# Patient Record
Sex: MALE | Race: WHITE | Employment: FULL TIME | URBAN - METROPOLITAN AREA
[De-identification: names, ages, dates, MRNs, and addresses within clinical notes are randomized per-mention and may not be internally consistent; named-entity substitution may affect disease eponyms.]

---

## 2021-08-09 ENCOUNTER — APPOINTMENT (OUTPATIENT)
Dept: CT IMAGING | Age: 26
DRG: 885 | End: 2021-08-09
Payer: COMMERCIAL

## 2021-08-09 ENCOUNTER — HOSPITAL ENCOUNTER (INPATIENT)
Age: 26
LOS: 3 days | Discharge: HOME | DRG: 885 | End: 2021-08-12
Attending: EMERGENCY MEDICINE | Admitting: PSYCHIATRY & NEUROLOGY
Payer: COMMERCIAL

## 2021-08-09 DIAGNOSIS — R45.851 SUICIDAL IDEATION: Primary | ICD-10-CM

## 2021-08-09 LAB
ACETAMINOPHEN LEVEL: <5 MCG/ML (ref 10–30)
ALBUMIN SERPL-MCNC: 4.5 G/DL (ref 3.5–5.2)
ALP BLD-CCNC: 79 U/L (ref 40–129)
ALT SERPL-CCNC: 35 U/L (ref 0–40)
AMPHETAMINE SCREEN, URINE: NOT DETECTED
ANION GAP SERPL CALCULATED.3IONS-SCNC: 7 MMOL/L (ref 7–16)
AST SERPL-CCNC: 21 U/L (ref 0–39)
BARBITURATE SCREEN URINE: NOT DETECTED
BASOPHILS ABSOLUTE: 0.04 E9/L (ref 0–0.2)
BASOPHILS RELATIVE PERCENT: 0.6 % (ref 0–2)
BENZODIAZEPINE SCREEN, URINE: NOT DETECTED
BILIRUB SERPL-MCNC: 0.6 MG/DL (ref 0–1.2)
BILIRUBIN URINE: NEGATIVE
BLOOD, URINE: NEGATIVE
BUN BLDV-MCNC: 12 MG/DL (ref 6–20)
CALCIUM SERPL-MCNC: 9.5 MG/DL (ref 8.6–10.2)
CANNABINOID SCREEN URINE: NOT DETECTED
CHLORIDE BLD-SCNC: 104 MMOL/L (ref 98–107)
CLARITY: CLEAR
CO2: 29 MMOL/L (ref 22–29)
COCAINE METABOLITE SCREEN URINE: NOT DETECTED
COLOR: YELLOW
CREAT SERPL-MCNC: 0.9 MG/DL (ref 0.7–1.2)
EOSINOPHILS ABSOLUTE: 0.13 E9/L (ref 0.05–0.5)
EOSINOPHILS RELATIVE PERCENT: 2 % (ref 0–6)
ETHANOL: <10 MG/DL (ref 0–0.08)
FENTANYL SCREEN, URINE: NOT DETECTED
GFR AFRICAN AMERICAN: >60
GFR NON-AFRICAN AMERICAN: >60 ML/MIN/1.73
GLUCOSE BLD-MCNC: 95 MG/DL (ref 74–99)
GLUCOSE URINE: NEGATIVE MG/DL
HCT VFR BLD CALC: 49.6 % (ref 37–54)
HEMOGLOBIN: 16.9 G/DL (ref 12.5–16.5)
IMMATURE GRANULOCYTES #: 0.01 E9/L
IMMATURE GRANULOCYTES %: 0.2 % (ref 0–5)
INFLUENZA A: NOT DETECTED
INFLUENZA B: NOT DETECTED
KETONES, URINE: NEGATIVE MG/DL
LEUKOCYTE ESTERASE, URINE: NEGATIVE
LYMPHOCYTES ABSOLUTE: 1.68 E9/L (ref 1.5–4)
LYMPHOCYTES RELATIVE PERCENT: 26 % (ref 20–42)
Lab: NORMAL
MCH RBC QN AUTO: 29.8 PG (ref 26–35)
MCHC RBC AUTO-ENTMCNC: 34.1 % (ref 32–34.5)
MCV RBC AUTO: 87.5 FL (ref 80–99.9)
METHADONE SCREEN, URINE: NOT DETECTED
MONOCYTES ABSOLUTE: 0.47 E9/L (ref 0.1–0.95)
MONOCYTES RELATIVE PERCENT: 7.3 % (ref 2–12)
NEUTROPHILS ABSOLUTE: 4.13 E9/L (ref 1.8–7.3)
NEUTROPHILS RELATIVE PERCENT: 63.9 % (ref 43–80)
NITRITE, URINE: NEGATIVE
OPIATE SCREEN URINE: NOT DETECTED
OXYCODONE URINE: NOT DETECTED
PDW BLD-RTO: 11.8 FL (ref 11.5–15)
PH UA: 6.5 (ref 5–9)
PHENCYCLIDINE SCREEN URINE: NOT DETECTED
PLATELET # BLD: 282 E9/L (ref 130–450)
PMV BLD AUTO: 9.2 FL (ref 7–12)
POTASSIUM REFLEX MAGNESIUM: 4.4 MMOL/L (ref 3.5–5)
PROTEIN UA: NEGATIVE MG/DL
RBC # BLD: 5.67 E12/L (ref 3.8–5.8)
SALICYLATE, SERUM: <0.3 MG/DL (ref 0–30)
SARS-COV-2 RNA, RT PCR: NOT DETECTED
SODIUM BLD-SCNC: 140 MMOL/L (ref 132–146)
SPECIFIC GRAVITY UA: 1.02 (ref 1–1.03)
TOTAL PROTEIN: 7.2 G/DL (ref 6.4–8.3)
TRICYCLIC ANTIDEPRESSANTS SCREEN SERUM: NEGATIVE NG/ML
UROBILINOGEN, URINE: 0.2 E.U./DL
WBC # BLD: 6.5 E9/L (ref 4.5–11.5)

## 2021-08-09 PROCEDURE — 80143 DRUG ASSAY ACETAMINOPHEN: CPT

## 2021-08-09 PROCEDURE — 85025 COMPLETE CBC W/AUTO DIFF WBC: CPT

## 2021-08-09 PROCEDURE — 87636 SARSCOV2 & INF A&B AMP PRB: CPT

## 2021-08-09 PROCEDURE — 80307 DRUG TEST PRSMV CHEM ANLYZR: CPT

## 2021-08-09 PROCEDURE — 70450 CT HEAD/BRAIN W/O DYE: CPT

## 2021-08-09 PROCEDURE — 1240000000 HC EMOTIONAL WELLNESS R&B

## 2021-08-09 PROCEDURE — 80179 DRUG ASSAY SALICYLATE: CPT

## 2021-08-09 PROCEDURE — 82077 ASSAY SPEC XCP UR&BREATH IA: CPT

## 2021-08-09 PROCEDURE — 93005 ELECTROCARDIOGRAM TRACING: CPT | Performed by: EMERGENCY MEDICINE

## 2021-08-09 PROCEDURE — 99284 EMERGENCY DEPT VISIT MOD MDM: CPT

## 2021-08-09 PROCEDURE — 80053 COMPREHEN METABOLIC PANEL: CPT

## 2021-08-09 PROCEDURE — 81003 URINALYSIS AUTO W/O SCOPE: CPT

## 2021-08-09 RX ORDER — MAGNESIUM HYDROXIDE/ALUMINUM HYDROXICE/SIMETHICONE 120; 1200; 1200 MG/30ML; MG/30ML; MG/30ML
30 SUSPENSION ORAL PRN
Status: DISCONTINUED | OUTPATIENT
Start: 2021-08-09 | End: 2021-08-12 | Stop reason: HOSPADM

## 2021-08-09 RX ORDER — HALOPERIDOL 5 MG
5 TABLET ORAL EVERY 6 HOURS PRN
Status: DISCONTINUED | OUTPATIENT
Start: 2021-08-09 | End: 2021-08-12 | Stop reason: HOSPADM

## 2021-08-09 RX ORDER — HALOPERIDOL 5 MG/ML
5 INJECTION INTRAMUSCULAR EVERY 6 HOURS PRN
Status: DISCONTINUED | OUTPATIENT
Start: 2021-08-09 | End: 2021-08-12 | Stop reason: HOSPADM

## 2021-08-09 RX ORDER — HYDROXYZINE HYDROCHLORIDE 10 MG/1
50 TABLET, FILM COATED ORAL 3 TIMES DAILY PRN
Status: DISCONTINUED | OUTPATIENT
Start: 2021-08-09 | End: 2021-08-12 | Stop reason: HOSPADM

## 2021-08-09 RX ORDER — ACETAMINOPHEN 325 MG/1
650 TABLET ORAL EVERY 6 HOURS PRN
Status: DISCONTINUED | OUTPATIENT
Start: 2021-08-09 | End: 2021-08-12 | Stop reason: HOSPADM

## 2021-08-09 ASSESSMENT — PAIN SCALES - GENERAL
PAINLEVEL_OUTOF10: 1
PAINLEVEL_OUTOF10: 0

## 2021-08-09 ASSESSMENT — PAIN DESCRIPTION - LOCATION: LOCATION: HEAD

## 2021-08-09 ASSESSMENT — ENCOUNTER SYMPTOMS
DIARRHEA: 0
EYE REDNESS: 0
CONSTIPATION: 0
BACK PAIN: 0
NAUSEA: 0
ABDOMINAL DISTENTION: 0
RHINORRHEA: 0
EYE PAIN: 0
BLOOD IN STOOL: 0
SORE THROAT: 0
COUGH: 0
VOMITING: 0
ABDOMINAL PAIN: 0
WHEEZING: 0
SHORTNESS OF BREATH: 0

## 2021-08-09 ASSESSMENT — PAIN DESCRIPTION - PAIN TYPE: TYPE: ACUTE PAIN

## 2021-08-09 NOTE — ED PROVIDER NOTES
Negative for abdominal distention, abdominal pain, blood in stool, constipation, diarrhea, nausea and vomiting. Genitourinary: Negative for difficulty urinating, dysuria, flank pain, frequency and hematuria. Musculoskeletal: Negative for arthralgias, back pain, myalgias and neck pain. Skin: Negative for rash and wound. Neurological: Negative for dizziness, syncope, speech difficulty, weakness, light-headedness, numbness and headaches. Psychiatric/Behavioral: Positive for agitation, dysphoric mood, self-injury and suicidal ideas. Negative for hallucinations. The patient is nervous/anxious. Physical Exam  Vitals and nursing note reviewed. Constitutional:       General: He is awake. He is not in acute distress. Appearance: He is not diaphoretic. HENT:      Head: Normocephalic. Comments: There is a small, 1 cm x 1 cm abrasion noted to the patient's left forehead, just inside his hairline. There is no active bleeding noted. Right Ear: External ear normal.      Left Ear: External ear normal.      Nose: Nose normal. No congestion or rhinorrhea. Comments: No blood in the nares. Mouth/Throat:      Mouth: Mucous membranes are moist.      Pharynx: Oropharynx is clear. No posterior oropharyngeal erythema. Comments: No blood in the posterior oropharynx. No intraoral lesions noted. Eyes:      General: No scleral icterus. Right eye: No discharge. Left eye: No discharge. Extraocular Movements: Extraocular movements intact. Conjunctiva/sclera: Conjunctivae normal.      Pupils: Pupils are equal, round, and reactive to light. Neck:      Comments: No tenderness to palpation of the spinous processes or paraspinous musculature  Cardiovascular:      Rate and Rhythm: Normal rate and regular rhythm. Heart sounds: Normal heart sounds. No murmur heard. No friction rub. No gallop.        Comments: Upper extremity and lower extremity distal pulses intact bilaterally +2/4  Pulmonary:      Effort: Pulmonary effort is normal. No respiratory distress. Breath sounds: Normal breath sounds. No wheezing, rhonchi or rales. Comments: Good air movement noted throughout. Abdominal:      General: There is no distension. Palpations: Abdomen is soft. Tenderness: There is no abdominal tenderness. There is no guarding or rebound. Musculoskeletal:         General: No tenderness or deformity. Normal range of motion. Cervical back: Normal range of motion and neck supple. No rigidity. No muscular tenderness. Right lower leg: No edema. Left lower leg: No edema. Lymphadenopathy:      Cervical: No cervical adenopathy. Skin:     General: Skin is warm and dry. Capillary Refill: Capillary refill takes less than 2 seconds. Findings: No erythema or rash. Neurological:      General: No focal deficit present. Mental Status: He is alert and oriented to person, place, and time. Sensory: No sensory deficit. Motor: No weakness. Coordination: Coordination normal.   Psychiatric:         Attention and Perception: Attention and perception normal. He does not perceive auditory or visual hallucinations. Mood and Affect: Mood is depressed. Affect is flat. Speech: Speech normal.         Behavior: Behavior is cooperative. Thought Content: Thought content does not include homicidal or suicidal ideation. Thought content does not include homicidal or suicidal plan. Comments: Patient denies suicidal ideation, but pink slip provided by PD indicates that he made comments about wanting to drive his car into traffic.          --------------------------------------------- PAST HISTORY ---------------------------------------------  Past Medical History:  has no past medical history on file. Past Surgical History:  has no past surgical history on file. Social History:  reports that he has never smoked.  He has never used smokeless tobacco. He reports current alcohol use. He reports previous drug use. Family History: family history is not on file. Home Meds: Not in a hospital admission. The patients home medications have been reviewed. Allergies: Pcn [penicillins] and Prednisone    ------------------------- NURSING NOTES AND VITALS REVIEWED ---------------------------  Date / Time Roomed:  8/9/2021  1:18 PM  ED Bed Assignment:  [facility]    The nursing notes within the ED encounter and vital signs as below have been reviewed. BP (!) 130/92   Pulse 94   Temp 98.2 °F (36.8 °C)   Resp 14   Ht 5' 3\" (1.6 m)   Wt 135 lb (61.2 kg)   SpO2 96%   BMI 23.91 kg/m²   -------------------------------------------------- RESULTS / INTERVENTIONS -------------------------------------------------  All laboratory and radiology tests have been reviewed by this physician.     LABS:  Results for orders placed or performed during the hospital encounter of 08/09/21   CBC Auto Differential   Result Value Ref Range    WBC 6.5 4.5 - 11.5 E9/L    RBC 5.67 3.80 - 5.80 E12/L    Hemoglobin 16.9 (H) 12.5 - 16.5 g/dL    Hematocrit 49.6 37.0 - 54.0 %    MCV 87.5 80.0 - 99.9 fL    MCH 29.8 26.0 - 35.0 pg    MCHC 34.1 32.0 - 34.5 %    RDW 11.8 11.5 - 15.0 fL    Platelets 339 648 - 758 E9/L    MPV 9.2 7.0 - 12.0 fL    Neutrophils % 63.9 43.0 - 80.0 %    Immature Granulocytes % 0.2 0.0 - 5.0 %    Lymphocytes % 26.0 20.0 - 42.0 %    Monocytes % 7.3 2.0 - 12.0 %    Eosinophils % 2.0 0.0 - 6.0 %    Basophils % 0.6 0.0 - 2.0 %    Neutrophils Absolute 4.13 1.80 - 7.30 E9/L    Immature Granulocytes # 0.01 E9/L    Lymphocytes Absolute 1.68 1.50 - 4.00 E9/L    Monocytes Absolute 0.47 0.10 - 0.95 E9/L    Eosinophils Absolute 0.13 0.05 - 0.50 E9/L    Basophils Absolute 0.04 0.00 - 0.20 E9/L   Comprehensive Metabolic Panel w/ Reflex to MG   Result Value Ref Range    Sodium 140 132 - 146 mmol/L    Potassium reflex Magnesium 4.4 3.5 - 5.0 mmol/L Chloride 104 98 - 107 mmol/L    CO2 29 22 - 29 mmol/L    Anion Gap 7 7 - 16 mmol/L    Glucose 95 74 - 99 mg/dL    BUN 12 6 - 20 mg/dL    CREATININE 0.9 0.7 - 1.2 mg/dL    GFR Non-African American >60 >=60 mL/min/1.73    GFR African American >60     Calcium 9.5 8.6 - 10.2 mg/dL    Total Protein 7.2 6.4 - 8.3 g/dL    Albumin 4.5 3.5 - 5.2 g/dL    Total Bilirubin 0.6 0.0 - 1.2 mg/dL    Alkaline Phosphatase 79 40 - 129 U/L    ALT 35 0 - 40 U/L    AST 21 0 - 39 U/L   Urinalysis, reflex to microscopic   Result Value Ref Range    Color, UA Yellow Straw/Yellow    Clarity, UA Clear Clear    Glucose, Ur Negative Negative mg/dL    Bilirubin Urine Negative Negative    Ketones, Urine Negative Negative mg/dL    Specific Gravity, UA 1.025 1.005 - 1.030    Blood, Urine Negative Negative    pH, UA 6.5 5.0 - 9.0    Protein, UA Negative Negative mg/dL    Urobilinogen, Urine 0.2 <2.0 E.U./dL    Nitrite, Urine Negative Negative    Leukocyte Esterase, Urine Negative Negative   URINE DRUG SCREEN   Result Value Ref Range    Drug Screen Comment: see below    Serum Drug Screen   Result Value Ref Range    Ethanol Lvl <10 mg/dL    Acetaminophen Level <5.0 (L) 10.0 - 40.1 mcg/mL    Salicylate, Serum <6.8 0.0 - 30.0 mg/dL    TCA Scrn NEGATIVE Cutoff:300 ng/mL   EKG 12 Lead   Result Value Ref Range    Ventricular Rate 75 BPM    Atrial Rate 75 BPM    P-R Interval 154 ms    QRS Duration 100 ms    Q-T Interval 364 ms    QTc Calculation (Bazett) 406 ms    P Axis 76 degrees    R Axis 13 degrees    T Axis 72 degrees       RADIOLOGY: Interpreted by Radiologist unless otherwise noted. CT Head WO Contrast    (Results Pending)       EKG: As interpreted by this ER physician.   Rate: 75 bpm  Rhythm: Sinus  Axis: normal  ST Segments: no acute change  T-Waves: no acute change  Interpretation: Sinus rhythm with sinus arrhythmia  Comparison: no previous EKG    Oxygen Saturation Interpretation: Normal    Meds Given:  Medications - No data to display    Procedures:  No procedures performed. --------------------------------- PROGRESS NOTES / ADDITIONAL PROVIDER NOTES ---------------------------------  Consultations:  As outlined below. ED Course:     1601: All results were discussed with the patient and I have provided specific details regarding the plan to admit the patient. The patient was stable at the time of admission and was without objective evidence of hemodynamic instability. The patient was seen in the emergency department by myself and the assigned attending physician, Dr. Zulay Calixto, who agreed with the assessment, plan and decision to admit as laid out herein. The patient verbalized an understanding and agreement with the plan for admission. All questions were answered and the patient was deemed to be in stable condition at the time of transport. MDM:  Patient presented from home after being pink slipped by local police with concerns for suicidal ideation. On arrival, the patient was hypertensive with remaining vital signs within normal limits. EKG and physical exam as documented above. Work-up was initiated to rule out organic causes of his psychiatric symptoms. Labs were grossly unremarkable. Head CT did not reveal any acute intracranial pathology. Given his negative work-up, the decision was made to have social work evaluate the patient. The patient's final disposition will be determined based on their recommendation. At this time the patient is stable and medically cleared. This patient's ED course included: a personal history and physicial examination, re-evaluation prior to disposition and multiple bedside re-evaluations    Diagnosis:  1. Suicidal ideation        Disposition:  Patient's disposition: Admit to mental health unit - medically cleared for admission  Patient's condition is stable. This patient was seen, examined and treated with Dr. Zulay Calixto.  All pertinent aspects of the patient's care were discussed with the attending physician.        Julio César Rivera DO  Resident  08/09/21 1935

## 2021-08-10 PROBLEM — F31.60 MIXED BIPOLAR AFFECTIVE DISORDER (HCC): Status: ACTIVE | Noted: 2021-08-10

## 2021-08-10 PROBLEM — F84.0 AUTISM SPECTRUM DISORDER: Status: ACTIVE | Noted: 2021-08-10

## 2021-08-10 LAB
CHOLESTEROL, TOTAL: 202 MG/DL (ref 0–199)
EKG ATRIAL RATE: 75 BPM
EKG P AXIS: 76 DEGREES
EKG P-R INTERVAL: 154 MS
EKG Q-T INTERVAL: 364 MS
EKG QRS DURATION: 100 MS
EKG QTC CALCULATION (BAZETT): 406 MS
EKG R AXIS: 13 DEGREES
EKG T AXIS: 72 DEGREES
EKG VENTRICULAR RATE: 75 BPM
HBA1C MFR BLD: 5.3 % (ref 4–5.6)
HDLC SERPL-MCNC: 46 MG/DL
LDL CHOLESTEROL CALCULATED: 146 MG/DL (ref 0–99)
TRIGL SERPL-MCNC: 52 MG/DL (ref 0–149)
VLDLC SERPL CALC-MCNC: 10 MG/DL

## 2021-08-10 PROCEDURE — 80061 LIPID PANEL: CPT

## 2021-08-10 PROCEDURE — 99222 1ST HOSP IP/OBS MODERATE 55: CPT | Performed by: NURSE PRACTITIONER

## 2021-08-10 PROCEDURE — 6370000000 HC RX 637 (ALT 250 FOR IP): Performed by: NURSE PRACTITIONER

## 2021-08-10 PROCEDURE — 1240000000 HC EMOTIONAL WELLNESS R&B

## 2021-08-10 PROCEDURE — 93010 ELECTROCARDIOGRAM REPORT: CPT | Performed by: INTERNAL MEDICINE

## 2021-08-10 PROCEDURE — 36415 COLL VENOUS BLD VENIPUNCTURE: CPT

## 2021-08-10 PROCEDURE — 83036 HEMOGLOBIN GLYCOSYLATED A1C: CPT

## 2021-08-10 RX ORDER — DIVALPROEX SODIUM 250 MG/1
250 TABLET, DELAYED RELEASE ORAL EVERY 12 HOURS SCHEDULED
Status: DISCONTINUED | OUTPATIENT
Start: 2021-08-10 | End: 2021-08-12 | Stop reason: HOSPADM

## 2021-08-10 RX ORDER — RISPERIDONE 1 MG/1
1 TABLET, FILM COATED ORAL NIGHTLY
Status: DISCONTINUED | OUTPATIENT
Start: 2021-08-10 | End: 2021-08-12 | Stop reason: HOSPADM

## 2021-08-10 RX ADMIN — DIVALPROEX SODIUM 250 MG: 250 TABLET, DELAYED RELEASE ORAL at 12:44

## 2021-08-10 RX ADMIN — DIVALPROEX SODIUM 250 MG: 250 TABLET, DELAYED RELEASE ORAL at 21:15

## 2021-08-10 RX ADMIN — RISPERIDONE 1 MG: 1 TABLET, FILM COATED ORAL at 21:15

## 2021-08-10 ASSESSMENT — SLEEP AND FATIGUE QUESTIONNAIRES
DO YOU USE A SLEEP AID: NO
DO YOU HAVE DIFFICULTY SLEEPING: NO
AVERAGE NUMBER OF SLEEP HOURS: 6
DO YOU HAVE DIFFICULTY SLEEPING: NO
DO YOU USE A SLEEP AID: NO
AVERAGE NUMBER OF SLEEP HOURS: 6

## 2021-08-10 ASSESSMENT — PAIN SCALES - GENERAL
PAINLEVEL_OUTOF10: 0

## 2021-08-10 ASSESSMENT — LIFESTYLE VARIABLES
HISTORY_ALCOHOL_USE: NO
HISTORY_ALCOHOL_USE: NO

## 2021-08-10 NOTE — PROGRESS NOTES
Patient denies SI/HI/Hallucinations. Patient reports feeling anxious and depressed about being admitted to the hospital. Patient stressed about missing work and unpaid bills. Patient A&Ox4, reports his wife called the police on him after they got into a verbal altercation. Patient reports threatening to harm himself out of anger and denies any intention of wanting to do so. Patient appears flat, sad, and anxious. Patient isolative and withdrawn to his room at times. Social with select peers while out on the unit. Patient taking prescribed medications, eating provided meals, and attending groups.

## 2021-08-10 NOTE — PROGRESS NOTES
[facility]  Initial Interdisciplinary Treatment Plan NOTE    Review Date & Time: 8/10/21 0900    Patient was in treatment team    Admission Type:   Admission Type: Involuntary    Reason for admission:  Reason for Admission: \"my wife woke me up at 6 after I had just gotten home from work at 36 and it made me angry, and because of my Autism, i get angry really quickly and hit my head and said things I shouldn't have\"      Estimated Length of Stay Update:  1-3 days  Estimated Discharge Date Update: 8/12/21    EDUCATION:   Learner Progress Toward Treatment Goals: Reviewed results and recommendations of this team and Reviewed goals and plan of care    Method: Small group    Outcome: Verbalized understanding    PATIENT GOALS: \"Be less irritable\"    PLAN/TREATMENT RECOMMENDATIONS UPDATE: Take prescribed medications, attend/participate in groups. Continue to provide emotional support to patient.     GOALS UPDATE:   Time frame for Short-Term Goals: 1-3 days    Henry Moise RN

## 2021-08-10 NOTE — H&P
Department of Psychiatry  History and Physical - Adult     CHIEF COMPLAINT:  \"I get frustrated easily. \"    Patient was seen after discussing with the treatment team and reviewing the chart    CIRCUMSTANCES OF ADMISSION:   Patient is a 27year old, male presenting to ED via pink slip by PD for suicidal threats and self injurious behaviors. Patient spouse reportedly called 911 due to patient banging his head against the wall and threatening to drive his car into traffic  HISTORY OF PRESENT ILLNESS:      The patient is a 27 y.o. male with significant past history of autism spectrum disorder, is , employed with no children living in an apartment with his wife, recently  in January of 2021, presented to  ED via pink slip by PD for suicidal threats and self injurious behaviors. Patient spouse reportedly called 911 due to patient banging his head against the wall and threatening to drive his car into traffic. Toxicology in the ED was negative, ETOH negative. Patient was medically cleared in the ED and admitted to [facility] for psychiatric evaluation and stabilization. Upon assessment today the patient is minimizing the events leading to his hospitalization. He is polite, pleasant and appropriate during assessment and forthcoming with information. He tells me he has a diagnosis of autism and was in counseling at age 17. He denies any past psychiatric hospitalizations, denies history of suicide attempts, but endorses self harm when frustrated by hitting him self in the head and face with objects. He tells me he is easily frustrated, is irritable at times, and has trouble calming down when upset. 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 He tells me he became frustrated and could not calm down. He states that he said several things he should not have said, and could not control his anger.  He states that he would never hurt himself and never hurt anyone else. But then states that he looses control when angry and would never hurt anyone on purpose but can't guarantee it wouldn't happen. Past Psychiatric history:  Patient states that he was in counseling at age 17. He denies any psychotropic medications. States that he has a diagnosis of autism. No suicide attempts, no inpatient psychiatric hospitalizations. Endorses history of self injurious behavior when frustrated states he would hit himself in the face and head. Family Psychiatric history:  Denies psychiatric history in family. States that no one has committed suicide. Legal history:  Denies    Substance abuse history:  Denies. UDS in ED negative, ETOH negative. Personal, Family, social history:  Patient states that he grew up in [facility] [place] was raised by his parents has has 3 half siblings. He states he graduated high school and attended college for 5 years but did not obtain a degree. He is considering returning to college. States that he works full time at a [employer], was recently  in January of 2021, has no children and he and his wife share an apartment. Past Medical History:    History reviewed. No pertinent past medical history. Medications Prior to Admission:   No medications prior to admission. Past Surgical History:    History reviewed. No pertinent surgical history. Allergies:   Pcn [penicillins] and Prednisone    Family History  History reviewed. No pertinent family history. EXAMINATION:    REVIEW OF SYSTEMS:    ROS:  [x] All negative/unchanged except if checked.  Explain positive(checked items) below:  [] Constitutional  [] Eyes  [] Ear/Nose/Mouth/Throat  [] Respiratory  [] CV  [] GI  []   [] Musculoskeletal  [] Skin/Breast  [] Neurological  [] Endocrine  [] Heme/Lymph  [] Allergic/Immunologic    Explanation:     Vitals:  /66   Pulse 82   Temp 98.2 °F (36.8 °C) (Temporal)   Resp 15   Ht 5' 3\" (1.6 m)   Wt 135 lb (61.2 kg)   SpO2 96%   BMI 23.91 kg/m²      Physical Examination:   Head: x  Atraumatic: x normocephalic  Skin and Mucosa        Moist x  Dry   Pale  x Normal   Neck:  Thyroid  Palpable   x  Not palpable   venus distention   adenopathy   Chest: x Clear   Rhonchi     Wheezing   CV:  xS1   xS2    xNo murmer   Abdomen:  x  Soft    Tender    Viceromegaly   Extremities:  x No Edema     Edema     Cranial Nerves Examination:   CN II:   xPupils are reactive to light  Pupils are non reactive to light  CN III, IV, VI:  xNo eye deviation    No diplopia or ptosis   CN V:    xFacial Sensation is intact     Facial Sensation is not intact   CN IIIV:   x Hearing is normal to rubbing fingers   CN IX, X:     xNormal gag reflex and phonation   CN XI:   xShoulder shrug and neck rotation is normal  CNXII:    xTongue is midline no deviation or atrophy    Mental Status Examination:    Mental status examination reveals a 61-year-old  male with average hygiene average grooming who appears younger than stated age is superficially forthcoming and cooperative for assessment. Psychomotor reveals no agitation retardation involuntary movement or abnormal posture. Speech is clear, well articulated he is able to answer questions with relevance and there is no delayed or long latency of response. Eye contact is average during assessment. Mood is \"normal.\"  Affect is relaxed, pleasant congruent with stated mood. Thought process is linear and goal-directed there is no looseness of association or flight of ideas. Thought content is devoid of auditory or visual hallucinations there is no overt or covert signs of psychosis or paranoia. Patient currently denies suicidal or homicidal ideation intent or plan, however states that when he is very frustrated he will hit himself in the head. There are no neurovegetative signs of depression. Insight judgment impulse control are poor. Cognitive function appears to be at baseline.   Memory is intact through conversation. He is alert and oriented to person place time situation and can recount the events leading to his hospitalization. DIAGNOSIS:  Mixed bipolar affective disorder (HCC)  Autism spectrum disorder    LABS: REVIEWED TODAY:  Recent Labs     08/09/21  1426   WBC 6.5   HGB 16.9*        Recent Labs     08/09/21  1426      K 4.4      CO2 29   BUN 12   CREATININE 0.9   GLUCOSE 95     Recent Labs     08/09/21  1426   BILITOT 0.6   ALKPHOS 79   AST 21   ALT 35     Lab Results   Component Value Date    LABAMPH NOT DETECTED 08/09/2021    BARBSCNU NOT DETECTED 08/09/2021    LABBENZ NOT DETECTED 08/09/2021    LABMETH NOT DETECTED 08/09/2021    OPIATESCREENURINE NOT DETECTED 08/09/2021    PHENCYCLIDINESCREENURINE NOT DETECTED 08/09/2021    ETOH <10 08/09/2021     No results found for: TSH, FREET4  No results found for: LITHIUM  No results found for: VALPROATE, CBMZ  No results found for: LITHIUM, VALPROATE      Radiology CT Head WO Contrast    Result Date: 8/9/2021  EXAMINATION: CT OF THE HEAD WITHOUT CONTRAST  8/9/2021 3:33 pm TECHNIQUE: CT of the head was performed without the administration of intravenous contrast. Dose modulation, iterative reconstruction, and/or weight based adjustment of the mA/kV was utilized to reduce the radiation dose to as low as reasonably achievable. COMPARISON: None. HISTORY: ORDERING SYSTEM PROVIDED HISTORY: Head trauma TECHNOLOGIST PROVIDED HISTORY: Reason for exam:->Head trauma Has a \"code stroke\" or \"stroke alert\" been called? ->No Decision Support Exception - unselect if not a suspected or confirmed emergency medical condition->Emergency Medical Condition (MA) What reading provider will be dictating this exam?->CRC FINDINGS: BRAIN/VENTRICLES: There is no acute intracranial hemorrhage, mass effect or midline shift. No abnormal extra-axial fluid collection. The gray-white differentiation is maintained without evidence of an acute infarct.   There is no evidence of hydrocephalus. ORBITS: The visualized portion of the orbits demonstrate no acute abnormality. SINUSES: The visualized paranasal sinuses and mastoid air cells demonstrate no acute abnormality. SOFT TISSUES/SKULL:  No acute abnormality of the visualized skull or soft tissues. No acute intracranial abnormality. TREATMENT PLAN:  The patient's diagnosis, treatment plan, medication management were formulated after patient was seen directly by the attending physician and myself and all relevant documentation was reviewed. Risk, benefit, side effects, possible outcomes of the medication and alternatives discussed with the patient and the patient demonstrated understanding. The patient was also educated that the outcome of treatment will depend on the medication compliance as directed by the prescribers along with regular follow-up, compliance with the labs and other work-up, as clinically indicated. Risk Management: Based on the diagnosis and assessment biopsychosocial treatment model was presented to the patient and was given the opportunity to ask any question. The patient was agreeable to the plan and all the patient's questions were answered to the patient's satisfaction. I discussed with the patient the risk, benefit, alternative and common side effects for the proposed medication treatment. The patient is consenting to this treatment. Collateral Information:  Will obtain collateral information from the family or friends. Will obtain medical records as appropriate from out patient providers  Will consult the hospitalist for a physical exam to rule out any co-morbid physical condition.     Home medication Reconciled       New Medications started during this admission :    Depakote 250 mg twice daily for mood stabilization and to reduce impulsivity  Risperdal 1 mg at at bedtime for impulsivity related to autism disorder  Valproate level on day 4 Depakote therapy    Prn Haldol 5mg and Vistaril 50mg q6hr for extreme agitation. Trazodone as ordered for insomnia  Vistaril as ordered for anxiety      Psychotherapy:   Encourage participation in milieu and group therapy  Individual therapy as needed    NOTE: This report was transcribed using voice recognition software. Every effort was made to ensure accuracy; however, inadvertent computerized transcription errors may be present. Behavioral Services  Medicare Certification Upon Admission    I certify that this patient's inpatient psychiatric hospital admission is medically necessary for:    [x] (1) Treatment which could reasonably be expected to improve this patient's condition,       [x] (2) Or for diagnostic study;     AND     [x](2) The inpatient psychiatric services are provided while the individual is under the care of a physician and are included in the individualized plan of care.     Estimated length of stay/service 5 to 7 days based on stability  Plan for post-hospital care follow with outpatient provider    Electronically signed by CAMPBELL Luis CNP on 8/10/2021 at 11:54 AM

## 2021-08-10 NOTE — PROGRESS NOTES
[facility]  Admission Note       Patient admitted from [facility], [address] [place] [address] 24E. Patient states that he has Asperger's/Autism and states that \"I can be very quick to anger. \"  Patient states that he works night shift and he got home at 730 in the morning and his wife woke him up at 6 to do chores around the house and states that \"I freaked out and I was yelling and hit my head off the wall. I said I was going to hurt myself but I was just angry and I have outbursts like that from time to time but I don't ever want to kill myself or hurt myself or anyone else. \"  Patient denies all and denies anxiety but rates his depression a 1/10 at this time. Patient states that he has no other psych history and has never been on any psych medications. Patient is pleasant and cooperative with admission. Patient states that he has a lot of stressors recently. He just moved to [place] from [address] [place] [address] in April 2021 and was recently  in January and his father passed away in January so him and his brother are tying to sell the house and they were going together to sell the truck today 8/10 and states that he's the only one working now between him and his wife because his wife's job isn't having her start until 8/23 so this paycheck is when all of the monthly bills are due and he's going to miss an entire week of week/pay due to being hospitalized and he's worried he's going to lose his car and house and everything due to missing this week of work. Admission Type:   Admission Type:  Involuntary    Reason for admission:  Reason for Admission: \"my wife woke me up at 6 after I had just gotten home from work at 36 and it made me angry, and because of my Autism, i get angry really quickly and hit my head and said things I shouldn't have\"    PATIENT STRENGTHS:  Strengths: Communication, Motivated, No significant Physical Illness, Positive Support    Patient Strengths and Limitations:  Limitations: Difficult relationships / poor social skills    Addictive Behavior:   Addictive Behavior  In the past 3 months, have you felt or has someone told you that you have a problem with:  : None  Do you have a history of Chemical Use?: No  Do you have a history of Alcohol Use?: No  Do you have a history of Street Drug Abuse?: No  Histroy of Prescripton Drug Abuse?: No    Medical Problems:   History reviewed. No pertinent past medical history. Status EXAM:  Status and Exam  Normal: No  Facial Expression: Worried  Affect: Appropriate, Congruent  Level of Consciousness: Alert  Mood:Normal: No  Mood: Sad  Motor Activity:Normal: Yes  Interview Behavior: Cooperative, Impulsive  Preception: [place] to Person, [place] to Time, [place] to Place, [place] to Situation  Attention:Normal: Yes  Thought Processes: Other(See comment) (organized; relevant)  Thought Content:Normal: Yes  Hallucinations: None  Delusions: No  Memory:Normal: Yes  Insight and Judgment: Yes  Present Suicidal Ideation: No  Present Homicidal Ideation: No    Tobacco Screening:  Practical Counseling, on admission, cindy X, if applicable and completed (first 3 are required if patient doesn't refuse):            ( )  Recognizing danger situations (included triggers and roadblocks)                    ( )  Coping skills (new ways to manage stress, exercise, relaxation techniques, changing routine, distraction)                                                           ( )  Basic information about quitting (benefits of quitting, techniques in how to quit, available resources  ( ) Referral for counseling faxed to [facility]                                           ( ) Patient refused counseling  ( x) Patient has not smoked in the last 30 days    Metabolic Screening:    No results found for: LABA1C    No results found for: CHOL  No results found for: TRIG  No results found for: HDL  No components found for: LDLCAL  No results found for: LABVLDL      Body mass index is 23.91 kg/m².     BP

## 2021-08-10 NOTE — PLAN OF CARE
Problem: Anger Management/Homicidal Ideation:  Goal: Ability to verbalize frustrations and anger appropriately will improve  Description: Ability to verbalize frustrations and anger appropriately will improve  Outcome: Met This Shift     Problem: Anger Management/Homicidal Ideation:  Goal: Absence of angry outbursts  Description: Absence of angry outbursts  Outcome: Met This Shift     Problem: Depressive Behavior With or Without Suicide Precautions:  Goal: Ability to disclose and discuss suicidal ideas will improve  Description: Ability to disclose and discuss suicidal ideas will improve  8/10/2021 0952 by Aris Carter RN  Outcome: Met This Shift  8/10/2021 0048 by Pina Traylor RN  Outcome: Met This Shift     Problem: Depressive Behavior With or Without Suicide Precautions:  Goal: Absence of self-harm  Description: Absence of self-harm  8/10/2021 0952 by Aris Carter RN  Outcome: Met This Shift  8/10/2021 0048 by Pina Traylor RN  Outcome: Met This Shift     Problem: Suicide risk  Goal: Provide patient with safe environment  Description: Provide patient with safe environment  Outcome: Met This Shift

## 2021-08-10 NOTE — ED NOTES
CO paperwork faxed to staffing office.       Jairo Powers RN  08/09/21 3559
Patient has been accepted for admission to [address] [place] [address] by Dr. Michael Beard. Patient to go to room #7301A. Called admitting and notified Marie Gotti.     N2N: JOSE [place], [place]  08/09/21 0153
Plan  [] Home:   [] Outpatient Provider:   [] Crisis Unit:   [x] Inpatient Psychiatric Unit:  [] Other:     Patient was pink slipped by PD. SW will pursue inpatient admission for safety/stabilization.        Jordan Loredo, MSW, ROSALIAW  08/09/21 7498

## 2021-08-10 NOTE — PROGRESS NOTES
Patient attended goals group and stated daily goal as to be less irritable. Group Therapy Note    Date: 8/10/2021  Start Time:10:00  End Time: 10:30  Number of Participants: 9    Type of Group: Psychoeducation    Wellness Binder Information  Module Name: Coping Skills  Session Number:  NA    Patient's Goal: To id positive coping skills to use on a daily basis. Notes: Attended group and was able to participate. Status After Intervention:  Improved    Participation Level:  Active Listener and Interactive    Participation Quality: Attentive and Sharing      Speech:  hesitant      Thought Process/Content: Linear      Affective Functioning: Flat      Mood: depressed      Level of consciousness:  Alert      Response to Learning: Progressing to goal      Endings: None Reported    Modes of Intervention: Education      Discipline Responsible: Psychoeducational Specialist      Signature:  PHAN Barnes

## 2021-08-10 NOTE — GROUP NOTE
Group Therapy Note    Date: 8/10/2021    Group Start Time: 1115  Group End Time: 1150  Group Topic: Cognitive Skills    SE 7W I    MANDI Sanchez        Group Therapy Note    Attendees: 7         Patient's Goal:  Pt will be able to identify importance of self care and will identify one thing they will do to take better care of self. Notes:  Pt participated in class and was able to acknowledge one way to take better care of self upon d/c. Abisai Abebe Status After Intervention:  Improved    Participation Level:  Active Listener and Interactive    Participation Quality: Appropriate, Attentive and Sharing      Speech:  normal      Thought Process/Content: Logical      Affective Functioning: Blunted      Mood: depressed      Level of consciousness:  Alert, Oriented x4 and Attentive      Response to Learning: Able to verbalize current knowledge/experience, Able to verbalize/acknowledge new learning and Progressing to goal      Endings: None Reported    Modes of Intervention: Education, Support, Socialization and Problem-solving      Discipline Responsible: /Counselor      Signature:  MANDI Sanchez

## 2021-08-10 NOTE — CARE COORDINATION
Biopsychosocial Assessment Note    Social work met with patient to complete the biopsychosocial assessment and CSSR-S. Mental Status Exam: Pt alert and oriented x 4. Pt was friendly and cooperative with this  throughout assessment. Pt's affect was flat. Pt's thought process was preoccupied, speech was clear. Chief Complaint: \"Patient is a 27year old, male presenting to ED via pink slip by PD for suicidal threats and self injurious behaviors. Patient spouse reportedly called 911 due to patient banging his head against the wall and threatening to drive his car into traffic. \"    Patient Report: Pt states that this is his first admission to a psychiatric facility. Pt minimizing situation that led him to the ED. ED note states \"presenting to ED via pink slip by PD for suicidal threats and self injurious behaviors. Patient spouse reportedly called 911 due to patient banging his head against the wall and threatening to drive his car into traffic. \" Pt did not disclose any of this to this  when asked. Pt denied suicide attempt history. Pt currently denying SI/ HI/ hallucinations/ delusions. Pt denied substance use. Pt denied trauma history. Gender  [x] Male [] Female [] Transgender  [] Other    Sexual Orientation    [x] Heterosexual [] Homosexual [] Bisexual [] Other    Suicidal Ideation  [] Past [] Present [x] Denies     Homicidal Ideation  [] Past [] Present [x] Denies     Hallucinations/Delusions (Specify type)  [] Reports [x] Denies     Substance Use/Alcohol Use/Addiction  [] Reports [x] Denies     Tobacco Use (within the last 6 months)  [] Reports [x] Denies     Trauma History  [] Reports [x] Denies     Collateral Contact (RAMON signed)  Name:  Emmy Zarate   Relationship: Spouse  Number: [phone]    Collateral Information: Sw spoke with pt's spouse, Edison. Edison states that she is concerned with pt's recent anger.  Edison states behaviors that the pt has been exhibiting has been concerning. Be Chavez states that the pt will begin to hit walls, throw things, etc. Be Chavez states that the pt is able to return to the home at discharge. No weapons in the home.        Access to Weapons per Collateral Contact: [] Reports [x] Denies       Follow up provider preference: Not currently active      Plan for discharge  Location (where do they plan on discharging to?): Home with spouse    Transportation (who will pick them up at discharge?) Spouse    Medications (will they have money for copays at discharge?): Patient or spouse

## 2021-08-11 VITALS
OXYGEN SATURATION: 98 % | HEIGHT: 63 IN | SYSTOLIC BLOOD PRESSURE: 108 MMHG | HEART RATE: 83 BPM | TEMPERATURE: 98.2 F | WEIGHT: 135 LBS | BODY MASS INDEX: 23.92 KG/M2 | DIASTOLIC BLOOD PRESSURE: 68 MMHG | RESPIRATION RATE: 16 BRPM

## 2021-08-11 PROCEDURE — 99231 SBSQ HOSP IP/OBS SF/LOW 25: CPT | Performed by: NURSE PRACTITIONER

## 2021-08-11 PROCEDURE — 6370000000 HC RX 637 (ALT 250 FOR IP): Performed by: NURSE PRACTITIONER

## 2021-08-11 PROCEDURE — 1240000000 HC EMOTIONAL WELLNESS R&B

## 2021-08-11 RX ADMIN — RISPERIDONE 1 MG: 1 TABLET, FILM COATED ORAL at 21:01

## 2021-08-11 RX ADMIN — DIVALPROEX SODIUM 250 MG: 250 TABLET, DELAYED RELEASE ORAL at 08:45

## 2021-08-11 RX ADMIN — DIVALPROEX SODIUM 250 MG: 250 TABLET, DELAYED RELEASE ORAL at 21:01

## 2021-08-11 ASSESSMENT — PAIN SCALES - GENERAL: PAINLEVEL_OUTOF10: 0

## 2021-08-11 NOTE — PROGRESS NOTES
Patient denies SI/HI/Hallucinations. Patient continues to complain of feeling anxious due to being hospitalized. Patient stressed about his job and finances. Patient isolative and withdrawn to his room at times, observed sleeping on and off. Patient appears flat, sad, and worried but brightens during conversation. Patient pleasant and cooperative. Patient social with select peers while out on the unit. Patient taking prescribed medications, eating provided meals, and attending groups.

## 2021-08-11 NOTE — PLAN OF CARE
Problem: Anger Management/Homicidal Ideation:  Goal: Able to display appropriate communication and problem solving  Description: Able to display appropriate communication and problem solving  Outcome: Met This Shift     Problem: Anger Management/Homicidal Ideation:  Goal: Ability to verbalize frustrations and anger appropriately will improve  Description: Ability to verbalize frustrations and anger appropriately will improve  8/10/2021 2112 by Jd Kearney RN  Outcome: Met This Shift     Problem: Anger Management/Homicidal Ideation:  Goal: Absence of angry outbursts  Description: Absence of angry outbursts  8/10/2021 2112 by Jd Kearney RN  Outcome: Met This Shift

## 2021-08-11 NOTE — CARE COORDINATION
Pt continues to be cooperative and friendly with staff. Pt is medication compliant. Pt is attending groups. Pt's sleep is decent, pt slept 5 hours last night. Pt may return home with spouse at discharged. Sw gained collateral from pt's spouse. Appointments scheduled [facility].  Estimated discharge date: 8/13 per treatment team.

## 2021-08-11 NOTE — PROGRESS NOTES
Patient has been out on the unit, pleasant, calm, and cooperative. Patient denies all and denies anxiety and states that he has just a \"little\" of depression. Patient has been social with other patients on the unit. Patient appears sad but brightens with conversation. Patient is preoccupied with not losing his job and making sure that he's discharged quickly in order to get back to work. Patient is encouraged to continue to work towards discharge goal by complying with medications, attending groups and to seek staff if feelings are overwhelming. Environmental rounds completed per unit policy to maintain safety of everyone on the unit. Staff will offer support and interventions as requested or required.

## 2021-08-11 NOTE — PLAN OF CARE
Problem: Anger Management/Homicidal Ideation:  Goal: Ability to verbalize frustrations and anger appropriately will improve  Description: Ability to verbalize frustrations and anger appropriately will improve  Outcome: Met This Shift     Problem: Anger Management/Homicidal Ideation:  Goal: Absence of angry outbursts  Description: Absence of angry outbursts  Outcome: Met This Shift     Problem: Depressive Behavior With or Without Suicide Precautions:  Goal: Ability to disclose and discuss suicidal ideas will improve  Description: Ability to disclose and discuss suicidal ideas will improve  Outcome: Met This Shift     Problem: Depressive Behavior With or Without Suicide Precautions:  Goal: Absence of self-harm  Description: Absence of self-harm  Outcome: Met This Shift     Problem: Suicide risk  Goal: Provide patient with safe environment  Description: Provide patient with safe environment  Outcome: Met This Shift

## 2021-08-11 NOTE — GROUP NOTE
Group Therapy Note    Date: 8/11/2021    Group Start Time: 9775  Group End Time: 0725  Group Topic: Psychoeducation    [facility]        Group Therapy Note    Attendees: 11         Patient's Goal:  Not get tripped up by all the little things. Notes: Active and engaged during wellness discussion. Pleasant and social with peers. Status After Intervention:  Improved    Participation Level:  Active Listener and Interactive    Participation Quality: Appropriate, Attentive and Sharing      Speech:  normal      Thought Process/Content: Logical      Affective Functioning: Congruent      Mood: euthymic      Level of consciousness:  Alert and Attentive      Response to Learning: Progressing to goal      Endings: None Reported    Modes of Intervention: Education, Support, Socialization and Exploration      Discipline Responsible: Psychoeducational Specialist      Signature:  Shelby Coyne, [address]

## 2021-08-11 NOTE — PROGRESS NOTES
BEHAVIORAL HEALTH FOLLOW-UP NOTE     8/11/2021     Patient was seen and examined in person, Chart reviewed   Patient's case discussed with staff/team    Chief Complaint: \"A little anxious\"    Interim History:   Patient is observed up on the unit, he is polite appropriate denies auditory or visual hallucinations denies suicidal or homicidal ideation intent or plan. He appears somewhat anxious and is looking forward to getting back to work and going home. He is agreeable to medications and treatment. He is attending groups and selectively social with peers. There have been no behavioral outbursts. Appetite:  [x] Normal/Unchanged  [] Increased  [] Decreased      Sleep:       [x] Normal/Unchanged  [] Fair       [] Poor              Energy:    [x] Normal/Unchanged  [] Increased  [] Decreased        SI [] Present  [x] Absent    HI  []Present  [x] Absent     Aggression:  [] yes  [x] no    Patient is [x] able  [] unable to CONTRACT FOR SAFETY     PAST MEDICAL/PSYCHIATRIC HISTORY:   History reviewed. No pertinent past medical history. FAMILY/SOCIAL HISTORY:  History reviewed. No pertinent family history. Social History     Socioeconomic History    Marital status:      Spouse name: Not on file    Number of children: Not on file    Years of education: Not on file    Highest education level: Not on file   Occupational History    Not on file   Tobacco Use    Smoking status: Never Smoker    Smokeless tobacco: Never Used   Substance and Sexual Activity    Alcohol use: Yes    Drug use: Not Currently    Sexual activity: Not on file   Other Topics Concern    Not on file   Social History Narrative    Not on file     Social Determinants of Health     Financial Resource Strain:     Difficulty of Paying Living Expenses:    Food Insecurity:     Worried About Running Out of Food in the Last Year:     [address] in the Last Year:    Transportation Needs:     Lack of Transportation (Medical):      Lack of Transportation (Non-Medical):    Physical Activity:     Days of Exercise per Week:     Minutes of Exercise per Session:    Stress:     Feeling of Stress :    Social Connections:     Frequency of Communication with Friends and Family:     Frequency of Social Gatherings with Friends and Family:     Attends Baptism Services:     Active Member of Clubs or Organizations:     Attends Club or Organization Meetings:     Marital Status:    Intimate Partner Violence:     Fear of Current or Ex-Partner:     Emotionally Abused:     Physically Abused:     Sexually Abused:            ROS:  [x] All negative/unchanged except if checked.  Explain positive(checked items) below:  [] Constitutional  [] Eyes  [] Ear/Nose/Mouth/Throat  [] Respiratory  [] CV  [] GI  []   [] Musculoskeletal  [] Skin/Breast  [] Neurological  [] Endocrine  [] Heme/Lymph  [] Allergic/Immunologic    Explanation:     MEDICATIONS:    Current Facility-Administered Medications:     divalproex (DEPAKOTE) DR tablet 250 mg, 250 mg, Oral, 2 times per day, CAMPBELL De León CNP, 250 mg at 08/11/21 0845    risperiDONE (RISPERDAL) tablet 1 mg, 1 mg, Oral, Nightly, CAMPBELL De León CNP, 1 mg at 08/10/21 2115    acetaminophen (TYLENOL) tablet 650 mg, 650 mg, Oral, Q6H PRN, Solomon Asencio MD    magnesium hydroxide (MILK OF MAGNESIA) 400 MG/5ML suspension 30 mL, 30 mL, Oral, Daily PRN, Solomon Asencio MD    aluminum & magnesium hydroxide-simethicone (MAALOX) 200-200-20 MG/5ML suspension 30 mL, 30 mL, Oral, PRN, Solomon Asencio MD    hydrOXYzine (ATARAX) tablet 50 mg, 50 mg, Oral, TID PRN, Solomon Asencio MD    haloperidol (HALDOL) tablet 5 mg, 5 mg, Oral, Q6H PRN **OR** haloperidol lactate (HALDOL) injection 5 mg, 5 mg, Intramuscular, Q6H PRN, Solomon Asencio MD      Examination:  BP 96/60   Pulse 100   Temp 97.8 °F (36.6 °C) (Temporal)   Resp 15   Ht 5' 3\" (1.6 m)   Wt 135 lb (61.2 kg)   SpO2 98%   BMI 23.91 kg/m²   Gait - steady  Medication side effects(SE): None reported    Mental Status Examination:    Level of consciousness:  within normal limits   Appearance: Good grooming and good hygiene  Behavior/Motor:  no abnormalities noted  Attitude toward examiner:  cooperative  Speech:  normal rate and normal volume   Mood: anxious  Affect:  mood congruent  Thought processes:  linear and goal directed   Thought content: The patient is devoid of suicidal or homicidal ideation intent or plan. Devoid of auditory or visual hallucinations or other perceptual disturbances, there are no overt or covert signs of psychosis or paranoia. There are no neurovegetative signs of depression. Cognition:  oriented to person, place, and time   Concentration intact  Insight fair   Judgement fair     ASSESSMENT:   Patient symptoms are:  [] Well controlled  [x] Improving  [] Worsening  [] No change      Diagnosis:   Principal Problem:    Mixed bipolar affective disorder (HCC)  Active Problems:    Suicidal ideation    Autism spectrum disorder  Resolved Problems:    * No resolved hospital problems.  *      LABS:    Recent Labs     08/09/21  1426   WBC 6.5   HGB 16.9*        Recent Labs     08/09/21  1426      K 4.4      CO2 29   BUN 12   CREATININE 0.9   GLUCOSE 95     Recent Labs     08/09/21  1426   BILITOT 0.6   ALKPHOS 79   AST 21   ALT 35     Lab Results   Component Value Date    LABAMPH NOT DETECTED 08/09/2021    BARBSCNU NOT DETECTED 08/09/2021    LABBENZ NOT DETECTED 08/09/2021    LABMETH NOT DETECTED 08/09/2021    OPIATESCREENURINE NOT DETECTED 08/09/2021    PHENCYCLIDINESCREENURINE NOT DETECTED 08/09/2021    ETOH <10 08/09/2021     No results found for: TSH, FREET4  No results found for: LITHIUM  No results found for: VALPROATE, CBMZ    Treatment Plan:  The patient's diagnosis, treatment plan, medication management were formulated after patient was seen directly by the attending physician and myself and all relevant documentation was reviewed. Reviewed current Medications with the patient. Risk, benefit, side effects, possible outcomes of the medication and alternatives discussed with the patient and the patient demonstrated understanding. The patient was also educated that the outcome of treatment will depend on the medication compliance as directed by the prescribers along with regular follow-up, compliance with the labs and other work-up, as clinically indicated. Depakote 250 mg twice daily for mood stabilization  Risperdal 1 mg at at bedtime for impulsivity related to autism spectrum disorder  Valproate level on day 4 Depakote therapy    Collateral information: Followed by social work  CD evaluation  Encourage patient to attend group and other milieu activities. Discharge planning discussed with the patient and treatment team.    PSYCHOTHERAPY/COUNSELING:  [x] Therapeutic interview  [x] Supportive  [] CBT  [] Ongoing  [] Other    [x] Patient continues to need, on a daily basis, active treatment furnished directly by or requiring the supervision of inpatient psychiatric personnel      Anticipated Length of stay: 3 to 5 days based on stability    NOTE: This report was transcribed using voice recognition software.  Every effort was made to ensure accuracy; however, inadvertent computerized transcription errors may be present.       Electronically signed by CAMPBELL Miller CNP on 8/11/2021 at 10:51 AM

## 2021-08-12 PROCEDURE — 99239 HOSP IP/OBS DSCHRG MGMT >30: CPT | Performed by: NURSE PRACTITIONER

## 2021-08-12 PROCEDURE — 6370000000 HC RX 637 (ALT 250 FOR IP): Performed by: NURSE PRACTITIONER

## 2021-08-12 RX ORDER — RISPERIDONE 1 MG/1
1 TABLET, FILM COATED ORAL NIGHTLY
Qty: 30 TABLET | Refills: 0 | Status: SHIPPED | OUTPATIENT
Start: 2021-08-12 | End: 2021-09-11

## 2021-08-12 RX ORDER — DIVALPROEX SODIUM 250 MG/1
250 TABLET, DELAYED RELEASE ORAL EVERY 12 HOURS SCHEDULED
Qty: 60 TABLET | Refills: 0 | Status: SHIPPED | OUTPATIENT
Start: 2021-08-12 | End: 2021-09-11

## 2021-08-12 RX ADMIN — DIVALPROEX SODIUM 250 MG: 250 TABLET, DELAYED RELEASE ORAL at 09:49

## 2021-08-12 NOTE — CARE COORDINATION
Xin spoke with Florencio Fuentes about any concerns with pt being discharged today. Rin denied any concerns for discharge and reported she will be picking pt up when he is ready. Xin filled out the required paperwork that was faxed and had NP Sree Lolling sign. Paperwork was handed to pt RN for him to complete his portion and for him to return to his employer.

## 2021-08-12 NOTE — SUICIDE SAFETY PLAN
SAFETY PLAN    A suicide Safety Plan is a document that supports someone when they are having thoughts of suicide. Warning Signs that indicate a suicidal crisis may be developing: What (situations, thoughts, feelings, body sensations, behaviors, etc.) do you experience that lets you know you are beginning to think about suicide? 1. Showing signs of frustration  2. Mood swing from positive to negative    Internal Coping Strategies:  What things can I do (relaxation techniques, hobbies, physical activities, etc.) to take my mind off my problems without contacting another person? 1. meditation  2. Stepping away from situation    People and social settings that provide distraction: Who can I call or where can I go to distract me? 1. Name: Trevor Rocha    2. Name: Jhonny Lemus    3. Place: take a walk            4. Place: go to a park    People whom I can ask for help: Who can I call when I need help - for example, friends, family, clergy, someone else? 1. Name: Benito Kehr or [address] [place] [address] I can contact during a crisis: Who can I call for help - for example, my doctor, my psychiatrist, my psychologist, a mental health provider, a suicide hotline? Suicide Prevention Lifeline: 2-134-505-TALK (7131)     [address] [place] [address] Emergency Services -  for example, [facility] suicide hotline, [facility] Hotline: 211    Making the environment safe: How can I make my environment (house/apartment/living space) safer? For example, can I remove guns, medications, and other items? 1.  Avoid negativity

## 2021-08-12 NOTE — CARE COORDINATION
In order to ensure appropriate transition and discharge planning is in place, the following documents have been transmitted to [facility] of [facility], as the new outpatient provider:    Ardyth Moritz The d/c diagnosis was transmitted to the next care provider   The reason for hospitalization was transmitted to the next care provider   The d/c medications (dosage and indication) were transmitted to the next care provider    The continuing care plan was transmitted to the next care provider

## 2021-08-12 NOTE — PROGRESS NOTES
Patient attended goals group and stated daily goal as to go home and step away from stress. Group Therapy Note    Date: 8/12/2021  Start Time:10:00  End Time:  10:30  Number of Participants: 9    Type of Group: Psychoeducation    Wellness Binder Information  Module Name:  Wellness tool box  Session Number:  na    Patient's Goal: To id positive things that are included in a wellness tool box. Notes: Attended group and was able to participate. Status After Intervention:  Improved    Participation Level:  Active Listener and Interactive    Participation Quality: Attentive and Sharing      Speech:  hesitant      Thought Process/Content: Linear      Affective Functioning: Flat      Mood: anxious and depressed      Level of consciousness:  Alert      Response to Learning: Progressing to goal      Endings: None Reported    Modes of Intervention: Education      Discipline Responsible: Psychoeducational Specialist      Signature:  PHAN Perez

## 2021-08-12 NOTE — PROGRESS NOTES
[id number] [facility]  Discharge Note    Pt discharged with followings belongings:   Dentures: None  Vision - Corrective Lenses: Glasses  Hearing Aid: None  Jewelry: Ring  Body Piercings Removed: N/A  Clothing: Footwear, Pants, Shirt, Socks  Were All Patient Medications Collected?: Not Applicable  Other Valuables: Cell phone, Gaile Miu, Other (Comment) (belt)   Valuables sent home with bgnet. Patient education on aftercare instructions: yes Patient verbalize understanding of AVS:  yes.     Status EXAM upon discharge:  Status and Exam  Normal: No  Facial Expression: Sad  Affect: Congruent  Level of Consciousness: Alert  Mood:Normal: No  Mood: Anxious (anxious for discharge)  Motor Activity:Normal: Yes  Motor Activity: Other(See Comments) (WDL)  Interview Behavior: Cooperative  Preception: Randolph to Person, Sita Mention to Time, Randolph to Place, Randolph to Situation  Attention:Normal: Yes  Thought Processes: Other(See comment) (clear, organized)  Thought Content:Normal: Yes  Thought Content: Other(See Comment) (relevent)  Hallucinations: None  Delusions: No  Memory:Normal: Yes  Insight and Judgment: No  Insight and Judgment: Other(See comment) (improved)  Present Suicidal Ideation: No  Present Homicidal Ideation: No      Metabolic Screening:    Lab Results   Component Value Date    LABA1C 5.3 08/10/2021       Lab Results   Component Value Date    CHOL 202 (H) 08/10/2021     Lab Results   Component Value Date    TRIG 52 08/10/2021     Lab Results   Component Value Date    HDL 46 08/10/2021     No components found for: [facility]REAT[facility] [place]  Lab Results   Component Value Date    LABVLDL 10 08/10/2021       Zahra Marrufo RN

## 2021-08-12 NOTE — DISCHARGE SUMMARY
DISCHARGE SUMMARY      Patient ID:  Janes Granger  [id number]  52 y.o.  1995    Admit date: 8/9/2021    Discharge date and time: 8/12/2021    Admitting Physician: Xuan Medley MD     Discharge Physician: Dr Kenya Black MD    Discharge Diagnoses:   Patient Active Problem List   Diagnosis    Suicidal ideation    Mixed bipolar affective disorder ([address]    Autism spectrum disorder       Admission Condition: poor    Discharged Condition: stable    Admission Circumstance:   Patient is a 27year old, male presenting to ED via pink slip by PD for suicidal threats and self injurious behaviors. Patient spouse reportedly called 911 due to patient banging his head against the wall and threatening to drive his car into traffic    PAST MEDICAL/PSYCHIATRIC HISTORY:   History reviewed. No pertinent past medical history. FAMILY/SOCIAL HISTORY:  History reviewed. No pertinent family history. Social History     Socioeconomic History    Marital status:      Spouse name: Not on file    Number of children: Not on file    Years of education: Not on file    Highest education level: Not on file   Occupational History    Not on file   Tobacco Use    Smoking status: Never Smoker    Smokeless tobacco: Never Used   Substance and Sexual Activity    Alcohol use: Yes    Drug use: Not Currently    Sexual activity: Not on file   Other Topics Concern    Not on file   Social History Narrative    Not on file     Social Determinants of Health     Financial Resource Strain:     Difficulty of Paying Living Expenses:    Food Insecurity:     Worried About Running Out of Food in the Last Year:     [address] in the Last Year:    Transportation Needs:     Lack of Transportation (Medical):      Lack of Transportation (Non-Medical):    Physical Activity:     Days of Exercise per Week:     Minutes of Exercise per Session:    Stress:     Feeling of Stress :    Social Connections:     Frequency of Communication with Friends and Family:     Frequency of Social Gatherings with Friends and Family:     Attends Oriental orthodox Services:     Active Member of Clubs or Organizations:     Attends Club or Organization Meetings:     Marital Status:    Intimate Partner Violence:     Fear of Current or Ex-Partner:     Emotionally Abused:     Physically Abused:     Sexually Abused:        MEDICATIONS:    Current Facility-Administered Medications:     divalproex (DEPAKOTE) DR tablet 250 mg, 250 mg, Oral, 2 times per day, CAMPBELL Orozco CNP, 250 mg at 08/11/21 2101    risperiDONE (RISPERDAL) tablet 1 mg, 1 mg, Oral, Nightly, CAMPBELL Orozco CNP, 1 mg at 08/11/21 2101    acetaminophen (TYLENOL) tablet 650 mg, 650 mg, Oral, Q6H PRN, Barbra Angel MD    magnesium hydroxide (MILK OF MAGNESIA) 400 MG/5ML suspension 30 mL, 30 mL, Oral, Daily PRN, Barbra Angel MD    aluminum & magnesium hydroxide-simethicone (MAALOX) 200-200-20 MG/5ML suspension 30 mL, 30 mL, Oral, PRN, Barbra Angel MD    hydrOXYzine (ATARAX) tablet 50 mg, 50 mg, Oral, TID PRN, Barbra Angel MD    haloperidol (HALDOL) tablet 5 mg, 5 mg, Oral, Q6H PRN **OR** haloperidol lactate (HALDOL) injection 5 mg, 5 mg, Intramuscular, Q6H PRN, Barbra Angel MD    Examination:  /68   Pulse 83   Temp 98.2 °F (36.8 °C) (Oral)   Resp 16   Ht 5' 3\" (1.6 m)   Wt 135 lb (61.2 kg)   SpO2 98%   BMI 23.91 kg/m²   Gait - steady    HOSPITAL COURSE[de-identified]  Following admission to the hospital, patient had a complete physical exam and blood work up, which he was medically cleared and admitted to [facility] for psychiatric evaluation and stabilization. The patient was monitored closely with suicide and appropriate precautions. He was started on Depakote 250 mg twice daily for mood stabilization and Risperdal 1 mg at at bedtime for impulsivity related to autism.   He was encouraged to participate in group and other milieu activity and started to feel better with this combination of treatment. There has been significant progress in the improvement of symptoms since admission. The patient has been an active participant in his treatment, and discharge planning. Patient was no longer suicidal, homicidal, manic or psychotic. He received the required treatment with medication, participated in group milieu, remained engaged in unit activities, learned appropriate coping skills. He was seen to be watching television socializing with peers using the phone. There were no mention or gestures of self-harm or harm to others. His mental status has returned to baseline. The treatment team believes the patient obtain maximum benefit out of this hospitalization and does not meet the criteria for inpatient hospitalization anymore. However he will continue to benefit from outpatient follow-up and treatment to maintain stability. Collateral information has been obtained and reconciled and there are no concerns about his safety. He has no access to guns or weapons. He appreciates the help that he received here. This patient no longer meets criteria for inpatient hospitalization. He was discharged home to his family in psychiatrically stable condition. Appetite:  [x] Normal  [] Increased  [] Decreased    Sleep:       [x] Normal  [] Fair       [] Poor            Energy:    [x] Normal  [] Increased  [] Decreased     SI [] Present  [x] Absent  HI  []Present  [x] Absent   Aggression:  [] yes  [] no  Patient is [x] able  [] unable to CONTRACT FOR SAFETY   Medication side effects(SE):  [x] None(Psych.  Meds.) [] Other      Mental Status Examination on discharge:    Level of consciousness:  within normal limits   Appearance:  well-appearing  Behavior/Motor:  no abnormalities noted  Attitude toward examiner:  attentive and good eye contact  Speech:  spontaneous, normal rate and normal volume   Mood: euthymic  Affect:  mood congruent  Thought processes:  linear and goal directed Thought content: The patient is devoid of suicidal or homicidal ideation intent or plan. Devoid of auditory or visual hallucinations or other perceptual disturbances, there are no overt or covert signs of psychosis or paranoia. There are no neurovegetative signs of depression. Cognition:  oriented to person, place, and time   Concentration intact  Memory intact  Insight good   Judgement fair   Fund of Knowledge adequate      ASSESSMENT:  Patient symptoms are:  [x] Well controlled  [x] Improving  [] Worsening  [] No change    Reason for more than one antipsychotic:  [x] N/A  [] 3 Failed Monotherapy attempts (Drugs tried:)  [] Crossover to a new antipsychotic  [] Taper to Monotherapy from Polypharmacy  [] Augmentation of clozapine therapy due to treatment resistance to single therapy    Diagnosis:  Principal Problem:    Mixed bipolar affective disorder ([facility] [address]  Active Problems:    Suicidal ideation    Autism spectrum disorder  Resolved Problems:    * No resolved hospital problems. *      LABS:    Recent Labs     08/09/21  1426   WBC 6.5   HGB 16.9*        Recent Labs     08/09/21  1426      K 4.4      CO2 29   BUN 12   CREATININE 0.9   GLUCOSE 95     Recent Labs     08/09/21  1426   BILITOT 0.6   ALKPHOS 79   AST 21   ALT 35     Lab Results   Component Value Date    LABAMPH NOT DETECTED 08/09/2021    BARBSCNU NOT DETECTED 08/09/2021    LABBENZ NOT DETECTED 08/09/2021    LABMETH NOT DETECTED 08/09/2021    OPIATESCREENURINE NOT DETECTED 08/09/2021    PHENCYCLIDINESCREENURINE NOT DETECTED 08/09/2021    ETOH <10 08/09/2021     No results found for: TSH, FREET4  No results found for: LITHIUM  No results found for: VALPROATE, CBMZ    RISK ASSESSMENT AT DISCHARGE: Low risk for suicide and homicide.      Treatment Plan:  The patient's diagnosis, treatment plan, medication management were formulated after patient was seen directly by the attending physician and myself and all relevant documentation was

## 2021-08-12 NOTE — GROUP NOTE
Group Therapy Note    Date: 8/12/2021    Group Start Time: 1100  Group End Time: 1125  Group Topic: Cognitive Skills    [facility]    JOSE Syed, ROSALIAW        Group Therapy Note    Attendees: 11         Patient's Goal:  To participate in the group discussion on thinking errors and how we can improve these errors. Notes:  Pt was an active participant in group discussion. Status After Intervention:  Improved    Participation Level:  Active Listener and Interactive    Participation Quality: Appropriate, Attentive, Sharing and Supportive      Speech:  normal      Thought Process/Content: Logical      Affective Functioning: Congruent      Mood: euthymic      Level of consciousness:  Alert and Oriented x4      Response to Learning: Able to verbalize current knowledge/experience, Able to verbalize/acknowledge new learning and Able to retain information      Endings: None Reported    Modes of Intervention: Education, Support, Socialization, Exploration, Clarifying and Problem-solving      Discipline Responsible: /Counselor      Signature:  Kirk Claude, MSW, PHAN

## 2021-08-12 NOTE — PLAN OF CARE
Patient reports he slept well last night and he is feeling good. He said he is feeling much better than when he was admitted. He denies SI, HI or hallucinations. He stated he feels ready for discharge.     Problem: Anger Management/Homicidal Ideation:  Goal: Absence of angry outbursts  Description: Absence of angry outbursts  8/12/2021 0938 by Alex Drummond RN  Outcome: Met This Shift     Problem: Depressive Behavior With or Without Suicide Precautions:  Goal: Able to verbalize and/or display a decrease in depressive symptoms  Description: Able to verbalize and/or display a decrease in depressive symptoms  Outcome: Met This Shift     Problem: Depressive Behavior With or Without Suicide Precautions:  Goal: Ability to disclose and discuss suicidal ideas will improve  Description: Ability to disclose and discuss suicidal ideas will improve  Outcome: Met This Shift     Problem: Depressive Behavior With or Without Suicide Precautions:  Goal: Absence of self-harm  Description: Absence of self-harm  Outcome: Met This Shift

## 2021-08-12 NOTE — CARE COORDINATION
Received a call from pt spouse Vamsi Andrew requesting the phone number, fax number, and name of dr treating pt. linnette provided Rin with this information.

## 2021-10-29 ENCOUNTER — APPOINTMENT (OUTPATIENT)
Dept: GENERAL RADIOLOGY | Age: 26
End: 2021-10-29
Payer: COMMERCIAL

## 2021-10-29 ENCOUNTER — HOSPITAL ENCOUNTER (EMERGENCY)
Age: 26
Discharge: HOME | End: 2021-10-29
Payer: COMMERCIAL

## 2021-10-29 VITALS
OXYGEN SATURATION: 96 % | WEIGHT: 130 LBS | SYSTOLIC BLOOD PRESSURE: 127 MMHG | RESPIRATION RATE: 16 BRPM | BODY MASS INDEX: 23.04 KG/M2 | DIASTOLIC BLOOD PRESSURE: 87 MMHG | HEART RATE: 92 BPM | TEMPERATURE: 97.6 F | HEIGHT: 63 IN

## 2021-10-29 DIAGNOSIS — J45.21 MILD INTERMITTENT ASTHMA WITH EXACERBATION: Primary | ICD-10-CM

## 2021-10-29 DIAGNOSIS — R05.9 COUGH: ICD-10-CM

## 2021-10-29 DIAGNOSIS — J02.9 ACUTE PHARYNGITIS, UNSPECIFIED ETIOLOGY: ICD-10-CM

## 2021-10-29 LAB — STREP GRP A PCR: NEGATIVE

## 2021-10-29 PROCEDURE — 99283 EMERGENCY DEPT VISIT LOW MDM: CPT

## 2021-10-29 PROCEDURE — 71045 X-RAY EXAM CHEST 1 VIEW: CPT

## 2021-10-29 PROCEDURE — 87880 STREP A ASSAY W/OPTIC: CPT

## 2021-10-29 RX ORDER — BROMPHENIRAMINE MALEATE, PSEUDOEPHEDRINE HYDROCHLORIDE, AND DEXTROMETHORPHAN HYDROBROMIDE 2; 30; 10 MG/5ML; MG/5ML; MG/5ML
5 SYRUP ORAL 4 TIMES DAILY PRN
Qty: 120 ML | Refills: 0 | Status: SHIPPED | OUTPATIENT
Start: 2021-10-29 | End: 2021-11-03

## 2021-10-29 RX ORDER — AZITHROMYCIN 250 MG/1
TABLET, FILM COATED ORAL
Qty: 1 PACKET | Refills: 0 | Status: SHIPPED | OUTPATIENT
Start: 2021-10-29 | End: 2021-11-08

## 2021-10-30 NOTE — ED PROVIDER NOTES
Independent   HPI:  10/29/21, Time: 9:07 PM EDT         Spring Tucker is a 27 y.o. male presenting to the ED for having an asthma attack earlier in the day as well as cough, congestion and sore throat. Patient presents to the emergency department with girlfriend, girlfriend reports that he does have a history of asthma states he had an asthma attack prior to coming in here, he does use an albuterol inhaler and nebulizer. He also reports that over the last several days he has had a cough bringing up clear to yellow-colored sputum and having a sore throat, unaware of any illness exposure. Patient denies any chest pain or shortness of breath denies any abdominal pain, no wheezing noted on exam.  Speech clear and coherent no stridor no respiratory discomfort. Patient also without any nausea vomiting or diarrhea. Symptoms mild in severity and persistent. Review of Systems:   A complete review of systems was performed and pertinent positives and negatives are stated within HPI, all other systems reviewed and are negative.          --------------------------------------------- PAST HISTORY ---------------------------------------------  Past Medical History:  has no past medical history on file. Past Surgical History:  has no past surgical history on file. Social History:  reports that he has never smoked. He has never used smokeless tobacco. He reports current alcohol use. He reports previous drug use. Family History: family history is not on file. The patients home medications have been reviewed.     Allergies: Pcn [penicillins] and Prednisone    -------------------------------------------------- RESULTS -------------------------------------------------  All laboratory and radiology results have been personally reviewed by myself   LABS:  Results for orders placed or performed during the hospital encounter of 10/29/21   Strep Screen Group A Throat    Specimen: Throat   Result Value Ref Range Strep Grp A PCR Negative Negative       RADIOLOGY:  Interpreted by Radiologist.  XR CHEST PORTABLE   Final Result   No evidence of active cardiopulmonary pathology. ------------------------- NURSING NOTES AND VITALS REVIEWED ---------------------------   The nursing notes within the ED encounter and vital signs as below have been reviewed. /87   Pulse 92   Temp 97.6 °F (36.4 °C) (Oral)   Resp 16   Ht 5' 3\" (1.6 m)   Wt 130 lb (59 kg)   SpO2 96%   BMI 23.03 kg/m²   Oxygen Saturation Interpretation: Normal      ---------------------------------------------------PHYSICAL EXAM--------------------------------------      Constitutional/General: Alert and oriented x3, well appearing, non toxic in NAD  Head: Normocephalic and atraumatic  Eyes: PERRL, EOMI  Mouth: Oropharynx erythematous with 1+ tonsillar swelling, no exudate. , handling secretions, no trismus  Neck: Supple, full ROM,   Pulmonary: Lungs clear to auscultation bilaterally, no wheezes, rales, or rhonchi. Not in respiratory distress, no wheezing no stridor. Lungs clear throughout all fields posteriorly. Cardiovascular:  Regular rate and rhythm, no murmurs, gallops, or rubs. 2+ distal pulses  Abdomen: Soft, non tender, non distended,   Extremities: Moves all extremities x 4. Warm and well perfused  Skin: warm and dry without rash  Neurologic: GCS 15,  Psych: Normal Affect      ------------------------------ ED COURSE/MEDICAL DECISION MAKING----------------------  Medications - No data to display      ED COURSE:       Medical Decision Making: Plan will be for chest x-ray will also obtain rapid strep, rapid strep is negative, chest x-ray showing no evidence of any active cardiopulmonary pathology. Patient was made aware of findings.   Plan will be for discharge home, patient will be provided with Z-Mathew for the acute pharyngitis as well as Bromfed cough syrup, he was made aware of the importance of good follow-up care as well as when to return back to the emergency department. Patient overall nontoxic, neurovascular intact. Patient made aware of good follow-up care as well as when to return. Patient expressed understanding and safely discharged home. Pulse ox 96% on room air. Counseling: The emergency provider has spoken with the patient and discussed todays results, in addition to providing specific details for the plan of care and counseling regarding the diagnosis and prognosis. Questions are answered at this time and they are agreeable with the plan.      --------------------------------- IMPRESSION AND DISPOSITION ---------------------------------    IMPRESSION  1. Mild intermittent asthma with exacerbation    2. Cough    3. Acute pharyngitis, unspecified etiology        DISPOSITION  Disposition: Discharge to home  Patient condition is good      NOTE: This report was transcribed using voice recognition software.  Every effort was made to ensure accuracy; however, inadvertent computerized transcription errors may be present     CAMPBELL Roach CNP  10/30/21 9161  ATTENDING PROVIDER ATTESTATION:     Supervising Physician, on-site, available for consultation, non-participatory in the evaluation or care of this patient       Lori Avila MD  10/30/21 6522